# Patient Record
Sex: FEMALE | Race: WHITE | Employment: FULL TIME | URBAN - METROPOLITAN AREA
[De-identification: names, ages, dates, MRNs, and addresses within clinical notes are randomized per-mention and may not be internally consistent; named-entity substitution may affect disease eponyms.]

---

## 2024-05-27 SDOH — HEALTH STABILITY: PHYSICAL HEALTH: ON AVERAGE, HOW MANY MINUTES DO YOU ENGAGE IN EXERCISE AT THIS LEVEL?: 120 MIN

## 2024-05-27 SDOH — HEALTH STABILITY: PHYSICAL HEALTH: ON AVERAGE, HOW MANY DAYS PER WEEK DO YOU ENGAGE IN MODERATE TO STRENUOUS EXERCISE (LIKE A BRISK WALK)?: 2 DAYS

## 2024-05-28 ENCOUNTER — OFFICE VISIT (OUTPATIENT)
Age: 29
End: 2024-05-28
Payer: OTHER GOVERNMENT

## 2024-05-28 VITALS
DIASTOLIC BLOOD PRESSURE: 69 MMHG | HEART RATE: 90 BPM | RESPIRATION RATE: 14 BRPM | BODY MASS INDEX: 24.89 KG/M2 | SYSTOLIC BLOOD PRESSURE: 101 MMHG | TEMPERATURE: 98.1 F | WEIGHT: 145.8 LBS | OXYGEN SATURATION: 100 % | HEIGHT: 64 IN

## 2024-05-28 DIAGNOSIS — F33.2 SEVERE EPISODE OF RECURRENT MAJOR DEPRESSIVE DISORDER, WITHOUT PSYCHOTIC FEATURES (HCC): ICD-10-CM

## 2024-05-28 DIAGNOSIS — R23.3 EASY BRUISING: Primary | ICD-10-CM

## 2024-05-28 DIAGNOSIS — R23.3 EASY BRUISING: ICD-10-CM

## 2024-05-28 LAB
25(OH)D3 SERPL-MCNC: 21.9 NG/ML (ref 30–100)
ALBUMIN SERPL-MCNC: 3.6 G/DL (ref 3.5–5)
ALBUMIN/GLOB SERPL: 1.3 (ref 1.1–2.2)
ALP SERPL-CCNC: 66 U/L (ref 45–117)
ALT SERPL-CCNC: 18 U/L (ref 12–78)
ANION GAP SERPL CALC-SCNC: 5 MMOL/L (ref 5–15)
AST SERPL-CCNC: 15 U/L (ref 15–37)
BASOPHILS # BLD: 0 K/UL (ref 0–0.1)
BASOPHILS NFR BLD: 0 % (ref 0–1)
BILIRUB SERPL-MCNC: 0.4 MG/DL (ref 0.2–1)
BUN SERPL-MCNC: 10 MG/DL (ref 6–20)
BUN/CREAT SERPL: 13 (ref 12–20)
CALCIUM SERPL-MCNC: 8.9 MG/DL (ref 8.5–10.1)
CHLORIDE SERPL-SCNC: 107 MMOL/L (ref 97–108)
CO2 SERPL-SCNC: 28 MMOL/L (ref 21–32)
CREAT SERPL-MCNC: 0.78 MG/DL (ref 0.55–1.02)
DIFFERENTIAL METHOD BLD: NORMAL
EOSINOPHIL # BLD: 0.3 K/UL (ref 0–0.4)
EOSINOPHIL NFR BLD: 4 % (ref 0–7)
ERYTHROCYTE [DISTWIDTH] IN BLOOD BY AUTOMATED COUNT: 13 % (ref 11.5–14.5)
GLOBULIN SER CALC-MCNC: 2.8 G/DL (ref 2–4)
GLUCOSE SERPL-MCNC: 92 MG/DL (ref 65–100)
HCT VFR BLD AUTO: 41.8 % (ref 35–47)
HGB BLD-MCNC: 14 G/DL (ref 11.5–16)
IMM GRANULOCYTES # BLD AUTO: 0 K/UL (ref 0–0.04)
IMM GRANULOCYTES NFR BLD AUTO: 0 % (ref 0–0.5)
LYMPHOCYTES # BLD: 2.2 K/UL (ref 0.8–3.5)
LYMPHOCYTES NFR BLD: 31 % (ref 12–49)
MCH RBC QN AUTO: 29.4 PG (ref 26–34)
MCHC RBC AUTO-ENTMCNC: 33.5 G/DL (ref 30–36.5)
MCV RBC AUTO: 87.6 FL (ref 80–99)
MONOCYTES # BLD: 0.5 K/UL (ref 0–1)
MONOCYTES NFR BLD: 7 % (ref 5–13)
NEUTS SEG # BLD: 4.1 K/UL (ref 1.8–8)
NEUTS SEG NFR BLD: 58 % (ref 32–75)
NRBC # BLD: 0 K/UL (ref 0–0.01)
NRBC BLD-RTO: 0 PER 100 WBC
PLATELET # BLD AUTO: 359 K/UL (ref 150–400)
PMV BLD AUTO: 9.8 FL (ref 8.9–12.9)
POTASSIUM SERPL-SCNC: 4 MMOL/L (ref 3.5–5.1)
PROT SERPL-MCNC: 6.4 G/DL (ref 6.4–8.2)
RBC # BLD AUTO: 4.77 M/UL (ref 3.8–5.2)
SODIUM SERPL-SCNC: 140 MMOL/L (ref 136–145)
TSH SERPL DL<=0.05 MIU/L-ACNC: 0.42 UIU/ML (ref 0.36–3.74)
WBC # BLD AUTO: 7.1 K/UL (ref 3.6–11)

## 2024-05-28 PROCEDURE — 99204 OFFICE O/P NEW MOD 45 MIN: CPT | Performed by: STUDENT IN AN ORGANIZED HEALTH CARE EDUCATION/TRAINING PROGRAM

## 2024-05-28 RX ORDER — BUSPIRONE HYDROCHLORIDE 5 MG/1
5 TABLET ORAL 2 TIMES DAILY
COMMUNITY
Start: 2024-04-30

## 2024-05-28 RX ORDER — MONTELUKAST SODIUM 10 MG/1
10 TABLET ORAL NIGHTLY
COMMUNITY
Start: 2024-05-05

## 2024-05-28 RX ORDER — LEVONORGESTREL 19.5 MG/1
1 INTRAUTERINE DEVICE INTRAUTERINE ONCE
COMMUNITY

## 2024-05-28 RX ORDER — METHYLPHENIDATE HYDROCHLORIDE 36 MG/1
36 TABLET, EXTENDED RELEASE ORAL EVERY MORNING
COMMUNITY

## 2024-05-28 RX ORDER — VENLAFAXINE HYDROCHLORIDE 75 MG/1
75 CAPSULE, EXTENDED RELEASE ORAL DAILY
COMMUNITY
Start: 2024-05-23

## 2024-05-28 SDOH — ECONOMIC STABILITY: FOOD INSECURITY: WITHIN THE PAST 12 MONTHS, THE FOOD YOU BOUGHT JUST DIDN'T LAST AND YOU DIDN'T HAVE MONEY TO GET MORE.: NEVER TRUE

## 2024-05-28 SDOH — ECONOMIC STABILITY: FOOD INSECURITY: WITHIN THE PAST 12 MONTHS, YOU WORRIED THAT YOUR FOOD WOULD RUN OUT BEFORE YOU GOT MONEY TO BUY MORE.: NEVER TRUE

## 2024-05-28 SDOH — ECONOMIC STABILITY: INCOME INSECURITY: HOW HARD IS IT FOR YOU TO PAY FOR THE VERY BASICS LIKE FOOD, HOUSING, MEDICAL CARE, AND HEATING?: NOT HARD AT ALL

## 2024-05-28 SDOH — ECONOMIC STABILITY: HOUSING INSECURITY
IN THE LAST 12 MONTHS, WAS THERE A TIME WHEN YOU DID NOT HAVE A STEADY PLACE TO SLEEP OR SLEPT IN A SHELTER (INCLUDING NOW)?: NO

## 2024-05-28 ASSESSMENT — COLUMBIA-SUICIDE SEVERITY RATING SCALE - C-SSRS
6. HAVE YOU EVER DONE ANYTHING, STARTED TO DO ANYTHING, OR PREPARED TO DO ANYTHING TO END YOUR LIFE?: NO
1. WITHIN THE PAST MONTH, HAVE YOU WISHED YOU WERE DEAD OR WISHED YOU COULD GO TO SLEEP AND NOT WAKE UP?: YES
3. HAVE YOU BEEN THINKING ABOUT HOW YOU MIGHT KILL YOURSELF?: YES
2. HAVE YOU ACTUALLY HAD ANY THOUGHTS OF KILLING YOURSELF?: YES
4. HAVE YOU HAD THESE THOUGHTS AND HAD SOME INTENTION OF ACTING ON THEM?: NO
5. HAVE YOU STARTED TO WORK OUT OR WORKED OUT THE DETAILS OF HOW TO KILL YOURSELF? DO YOU INTEND TO CARRY OUT THIS PLAN?: NO

## 2024-05-28 ASSESSMENT — PATIENT HEALTH QUESTIONNAIRE - PHQ9
6. FEELING BAD ABOUT YOURSELF - OR THAT YOU ARE A FAILURE OR HAVE LET YOURSELF OR YOUR FAMILY DOWN: MORE THAN HALF THE DAYS
7. TROUBLE CONCENTRATING ON THINGS, SUCH AS READING THE NEWSPAPER OR WATCHING TELEVISION: NEARLY EVERY DAY
SUM OF ALL RESPONSES TO PHQ QUESTIONS 1-9: 15
3. TROUBLE FALLING OR STAYING ASLEEP: MORE THAN HALF THE DAYS
9. THOUGHTS THAT YOU WOULD BE BETTER OFF DEAD, OR OF HURTING YOURSELF: MORE THAN HALF THE DAYS
SUM OF ALL RESPONSES TO PHQ QUESTIONS 1-9: 15
5. POOR APPETITE OR OVEREATING: NOT AT ALL
2. FEELING DOWN, DEPRESSED OR HOPELESS: MORE THAN HALF THE DAYS
1. LITTLE INTEREST OR PLEASURE IN DOING THINGS: MORE THAN HALF THE DAYS
SUM OF ALL RESPONSES TO PHQ QUESTIONS 1-9: 13
SUM OF ALL RESPONSES TO PHQ9 QUESTIONS 1 & 2: 4
SUM OF ALL RESPONSES TO PHQ QUESTIONS 1-9: 15
10. IF YOU CHECKED OFF ANY PROBLEMS, HOW DIFFICULT HAVE THESE PROBLEMS MADE IT FOR YOU TO DO YOUR WORK, TAKE CARE OF THINGS AT HOME, OR GET ALONG WITH OTHER PEOPLE: VERY DIFFICULT
4. FEELING TIRED OR HAVING LITTLE ENERGY: MORE THAN HALF THE DAYS
8. MOVING OR SPEAKING SO SLOWLY THAT OTHER PEOPLE COULD HAVE NOTICED. OR THE OPPOSITE, BEING SO FIGETY OR RESTLESS THAT YOU HAVE BEEN MOVING AROUND A LOT MORE THAN USUAL: NOT AT ALL

## 2024-05-28 NOTE — PROGRESS NOTES
Chief Complaint   Patient presents with    New Patient     Previous PCP: Dr. Reji Luo - [facility]  Establish Care    Bleeding/Bruising     \"Random bruising for over a year\" no trauma or injury  Swollen ankles intermittent for over two years - denies trauma or injury L> R     \"Have you been to the ER, urgent care clinic since your last visit?  Hospitalized since your last visit?\"    States she went to an ER due to peanut allergy.    “Have you seen or consulted any other health care providers outside of [facility] since your last visit?”    NO        “Have you had a pap smear?”    October 2023 - [facility] - requesting records    No cervical cancer screening on file                 5/28/2024     9:50 AM   PHQ-9    Little interest or pleasure in doing things 2   Feeling down, depressed, or hopeless 2   Trouble falling or staying asleep, or sleeping too much 2   Feeling tired or having little energy 2   Poor appetite or overeating 0   Feeling bad about yourself - or that you are a failure or have let yourself or your family down 2   Trouble concentrating on things, such as reading the newspaper or watching television 3   Moving or speaking so slowly that other people could have noticed. Or the opposite - being so fidgety or restless that you have been moving around a lot more than usual 0   Thoughts that you would be better off dead, or of hurting yourself in some way 2   PHQ-2 Score 4   PHQ-9 Total Score 15   If you checked off any problems, how difficult have these problems made it for you to do your work, take care of things at home, or get along with other people? 2           Financial Resource Strain: Low Risk  (5/28/2024)    Overall Financial Resource Strain (CARDIA)     Difficulty of Paying Living Expenses: Not hard at all      Food Insecurity: No Food Insecurity (5/28/2024)    Hunger Vital Sign     Worried About Running Out of Food in the Last Year: Never true

## 2024-05-28 NOTE — PROGRESS NOTES
[facility]      Chief Complaint:     Chief Complaint   Patient presents with    New Patient     Previous PCP: Dr. Reji Luo - [facility]  Establish Care    Bleeding/Bruising     \"Random bruising for over a year\" no trauma or injury  Swollen ankles intermittent for over two years - denies trauma or injury L> R       Rosa Cisse is a 28 y.o. female that presents for: establish care      Assessment/Plan:     Rosa was seen today for new patient and bleeding/bruising.    Diagnoses and all orders for this visit:    Easy bruising  -     CBC with Auto Differential; Future  -     TSH; Future  -     Comprehensive Metabolic Panel; Future    Severe episode of recurrent major depressive disorder, without psychotic features (HCC)  -     TSH; Future  -     Vitamin D 25 Hydroxy; Future           Follow up:     Return in about 1 year (around 5/29/2025) for or sooner as needed.     Subjective:   HPI:  Rosa Cisse is a 28 y.o. female that presents for: establish care    Previous PCP: Dr. Reji Luo    CC:  Easy bruising/ round bruising on shins, no pain  Night sweats x years  No fevers, chills, weight change, regular periods, no nose bleeds, no myalgias or arthralgias, no joint dislocations  No alcohol, occasional THC gummies once a month  Exercises by walking      [employer]  Depression: Psychiatrist is Dr. Ze Delgado at [facility], following closely. [facility].  PHQ 9 score 15 today with passive SI that has been stable for years, no active SI. Taking Effexor 75mg XR, Buspar 5mg BID.   ADHD, anxiety: Concerta 36mg  Allergies, dermatographia, asthma, nut allergy: Singulair, has epipen  Has Kyleena IUD    HM:  October 2023 - [facility] [place] - requesting records       Health Maintenance:  Health Maintenance Due   Topic Date Due    Hepatitis B vaccine (1 of 3 - 3-dose series) Never done    COVID-19 Vaccine (1) Never done